# Patient Record
Sex: FEMALE | NOT HISPANIC OR LATINO | Employment: UNEMPLOYED | URBAN - METROPOLITAN AREA
[De-identification: names, ages, dates, MRNs, and addresses within clinical notes are randomized per-mention and may not be internally consistent; named-entity substitution may affect disease eponyms.]

---

## 2022-06-29 LAB — HM MAMMOGRAM LEFT: NORMAL

## 2023-12-13 ENCOUNTER — OFFICE VISIT (OUTPATIENT)
Dept: OTOLARYNGOLOGY | Facility: CLINIC | Age: 68
End: 2023-12-13
Payer: COMMERCIAL

## 2023-12-13 ENCOUNTER — CLINICAL SUPPORT (OUTPATIENT)
Dept: AUDIOLOGY | Facility: CLINIC | Age: 68
End: 2023-12-13
Payer: COMMERCIAL

## 2023-12-13 VITALS — DIASTOLIC BLOOD PRESSURE: 81 MMHG | TEMPERATURE: 97.9 F | WEIGHT: 141 LBS | SYSTOLIC BLOOD PRESSURE: 131 MMHG

## 2023-12-13 DIAGNOSIS — R68.2 DRY MOUTH: ICD-10-CM

## 2023-12-13 DIAGNOSIS — H90.3 SENSORINEURAL HEARING LOSS (SNHL) OF BOTH EARS: Primary | ICD-10-CM

## 2023-12-13 DIAGNOSIS — H61.21 IMPACTED CERUMEN OF RIGHT EAR: ICD-10-CM

## 2023-12-13 PROCEDURE — 1126F AMNT PAIN NOTED NONE PRSNT: CPT

## 2023-12-13 PROCEDURE — 1159F MED LIST DOCD IN RCRD: CPT

## 2023-12-13 PROCEDURE — 1160F RVW MEDS BY RX/DR IN RCRD: CPT

## 2023-12-13 PROCEDURE — 92550 TYMPANOMETRY & REFLEX THRESH: CPT | Performed by: AUDIOLOGIST

## 2023-12-13 PROCEDURE — 69210 REMOVE IMPACTED EAR WAX UNI: CPT

## 2023-12-13 PROCEDURE — 99203 OFFICE O/P NEW LOW 30 MIN: CPT

## 2023-12-13 PROCEDURE — 92557 COMPREHENSIVE HEARING TEST: CPT | Performed by: AUDIOLOGIST

## 2023-12-13 RX ORDER — CELECOXIB 100 MG/1
CAPSULE ORAL EVERY 24 HOURS
COMMUNITY
Start: 2023-06-15

## 2023-12-13 RX ORDER — CALCIUM CARBONATE 600 MG
TABLET ORAL EVERY 24 HOURS
COMMUNITY

## 2023-12-13 RX ORDER — CYCLOBENZAPRINE HCL 10 MG
TABLET ORAL EVERY 24 HOURS
COMMUNITY

## 2023-12-13 RX ORDER — ATORVASTATIN CALCIUM 80 MG/1
TABLET, FILM COATED ORAL EVERY 24 HOURS
COMMUNITY

## 2023-12-13 RX ORDER — ROMOSOZUMAB-AQQG 105 MG/1.17ML
INJECTION, SOLUTION SUBCUTANEOUS
COMMUNITY

## 2023-12-13 ASSESSMENT — PAIN - FUNCTIONAL ASSESSMENT: PAIN_FUNCTIONAL_ASSESSMENT: 0-10

## 2023-12-13 ASSESSMENT — PAIN SCALES - GENERAL: PAINLEVEL_OUTOF10: 0 - NO PAIN

## 2023-12-13 ASSESSMENT — ENCOUNTER SYMPTOMS: OCCASIONAL FEELINGS OF UNSTEADINESS: 0

## 2023-12-13 NOTE — PROGRESS NOTES
DIAGNOSES/PROBLEMS:  -Sensorineural hearing loss, bilateral  -Cerumen impaction, right ear  -Dry oral mucosa  PROVIDER IMPRESSIONS:  ASSESSMENT: Carlos Hawkins  is a pleasant 68 y.o. female who presents with symptoms of hearing difficulty in both ears and throat discomfort in cold temperatures. Based on the clinical information provided, symptoms and clinical exam findings are consistent with partial cerumen impaction in the right ear and dry oral mucosa. Using appropriate instrumentation, cerumen successfully removed from the right EAC. Reassurance provided that otologic exam appears normal today without evidence of acute infection or inflammation bilaterally. Audiogram reviewed in detail with the patient and her son, revealing mild sensorineural hearing loss across all frequencies in bilateral ears. Given the patiens concern for hearing difficulty, we discussed that she should consider hearing aid evaluation for amplification needs. Patient declined consideration for hearing aids at this time and we agreed to perform routine annual surveillance of her hearing function instead.      PLAN:  I recommended increased oral fluid intake, air humidifier for the bedroom, nasal saline spray as needed, and Biotene mouth rinse as needed to improve moisture for the nasopharynx. Sample provided of [place] nasal saline spray.   Follow-up: Patient may schedule for follow up in one year with repeat audiogram as routine surveillance of hearing function. Patient was instructed to follow up sooner if needed or for any new/worsening otologic symptoms. Patient is amendable to plan. All questions answered to patient and her son's satisfaction.   Subjective   Patient ID Carlos Hawkins is a 68 y.o. female who presents for initial evaluation of hearing loss in both ears and throat discomfort in cold temperature changes. Patient is accompanied to the visit today by her son for Upper sorbian translation.   History of Present Illness  Carlos Hawkins is a  68 y.o. female here for hearing loss in both ears that has progressively worsened over the past six years. Reported difficulty hearing softer speech sounds but is still able to communicate. She believes that she is able to hear slightly better in the right ear when compared to the left ear. When asked about ear pain, ear itching,  ear drainage, aural fullness, hearing loss, autophony, tinnitus, dizziness or vertigo, she admits to none.   When asked about previous ear surgeries, family history of hearing loss, or exposure to loud noise, the patient admits to a history of presbycusis with her mother and sister. When asked about past or current use of hearing aids, the patient admits to none. When asked about past or current use of Q-tips or foreign objects in the ear canal, the patient admits to none. Mentions that she has throat dryness/discomfort with abrupt cold temperatures in the weather. Son states that she does not drink much water throughout the day.     No past medical history on file.   No past surgical history on file.   No Known Allergies     Current Outpatient Medications:     atorvastatin (Lipitor) 80 mg tablet, once every 24 hours., Disp: , Rfl:     calcium carbonate 600 mg calcium (1,500 mg) tablet, once every 24 hours., Disp: , Rfl:     celecoxib (CeleBREX) 100 mg capsule, once every 24 hours., Disp: , Rfl:     cyclobenzaprine (Flexeril) 10 mg tablet, once every 24 hours., Disp: , Rfl:     romosozumab (Evenity) 210mg/2.34mL ( 105mg/1.17mLx2) prefilled syringe, 2.34 mL Subcutaneous, Disp: , Rfl:    Tobacco Use: Not on file      Alcohol Use: Not on file      Social History     Substance and Sexual Activity   Drug Use Not on file        Review of Systems   All other systems negative.     Objective   Visit Vitals  /81 (BP Location: Left arm, Patient Position: Sitting, BP Cuff Size: Adult)   Temp 36.6 °C (97.9 °F)   Wt 64 kg (141 lb)      Physical Exam  General appearance: Appears well,  well-nourished, well groomed. No acute distress.   Constitutional: No fever, chills, weight loss or weight gain.  Communication: Normal communication in Azeri.   Psychiatric: Oriented to person, place and time. Normal mood and affect.  Neurologic: Cranial nerves II-XII grossly intact and symmetric bilaterally.  Cardiovascular: Examination of peripheral vascular system shows no clubbing or cyanosis.  Respiratory: No respiratory distress increased work of breathing. Inspection of the chest with symmetric chest expansion and normal respiratory effort.  Skin: No head and neck rashes.  Head: Normocephalic. Atraumatic with no masses, lesions or scarring.  Face: Normal symmetry. No scars or deformities.  Eyes: Conjunctiva not edematous or erythematous. PERRLA  Neck: Supple and symmetric, trachea midline. Lymph nodes with no adenopathy.  Head: Normocephalic. Atraumatic with no masses, lesions or scarring.  Eyes: PERRL, EOMI, Conjunctiva is clear. No nystagmus.  Nose: External inspection of nose: No nasal lesions, lacerations or scars. No tenderness on frontal or maxillary sinus palpation.  Throat:  Floor of mouth is clear, no masses.  Tongue appears normal, no lesions or masses. Gums, gingiva, buccal mucosa appear pink and dry, no lesions. Teeth are intact.  No obvious dental infections.  Peritonsillar regions appear symmetric without swelling.  Hard and soft palate appear normal, no obvious cleft. Uvula is midline.  Left Tonsil -- 2+ no exudates.  Right Tonsil -- 2+ no exudates.  Oropharynx: No lesions. Retropharyngeal wall is flat.  []postnasal drip.  Salivary Glands: Symmetric bilaterally.  No palpable masses.  No evidence of acute infection or salivary stones.  Neck: Supple, no lymphadenopathy.  No masses.  TMJ: Normal, no trismus.  Right Ear: External inspection of ear with no deformity, scars, or masses. Mastoid is nontender. External auditory canal is partially impacted with cerumen. Unable to visualize TM.   Left  Ear: External inspection of ear with no deformity, scars, or masses. Mastoid is nontender. External auditory canal clear.  Tympanic membrane is intact with no sign of infection, effusion, or retraction.  No perforation seen. Auto insufflation visible under microscopy.    Results   I personally reviewed the patient's audiogram today from 12/13/2023 which showed: Mild sensorineural hearing loss across all frequencies in bilateral ears. Normal tympanogram bilaterally. At 75 dB, there is 88% word discrimination bilaterally. Acoustic reflexes absent right ipsilateral, and present left ipsilateral only at 500 and 1000 Hz and absent above.     Procedures   EAR CLEANING PROCEDURE NOTE:  Indication: Cerumen impaction  Location: right ear canals  Procedure Note: The procedure was performed by the provider.  Visualization Instrument: A microscope with a #5 speculum was placed in the ear canals to visualize the ear canal debris.  Ear Cleaning Instrument and Outcome: Using the suction/alligator, a moderate amount of firm, brown cerumen was removed from the impacted EAC(s).  Post-Procedure/Microscopic Otologic Exam:  Right Ear--TM is intact with no sign of infection, effusion, or retraction.  No perforation seen. EAC is clear. Auto insufflation visible under microscopy.  Patient Status: The patient tolerated the procedure well.  Complications: There were no complications.     Mila Block, APRN-CNP

## 2023-12-13 NOTE — PROGRESS NOTES
AUDIOLOGY ADULT AUDIOMETRIC EVALUATION      Name:  Carlos Hawkins  :  1955  Age:  68 y.o.  Date of Evaluation: 23    History:  Reason for visit:  Ms. Carlos Hawkins was seen today as part of the visit with KAYLIE Luciano for an evaluation of hearing.   The patient was accompanied by her son who assisted in providing the case history, as the patient speaks limited English.  Chief Complaint   Patient presents with    Hearing Loss     Stated for the past six years she has noticed a gradual loss of hearing sensitivity. Reported some difficulty hearing softer sounds of speech, however, is still able to communicate. Her son reported he believes there is a history of presbycusis in the family.  Stated she has a history of dry cerumen buildup and that when she experiences a cold it may muffle her hearing.   Denied any current otalgia, aural fullness, tinnitus, dizziness/vertigo, recent ear/sinus infections, recent falls, or sudden hearing loss.    EVALUATION      See Audiogram    RESULTS:    Otoscopic Evaluation:   Right Ear: Otoscopy revealed deep dry partially occluding cerumen and the tympanic membrane was unable able to be clearly visualized.   Left Ear: Otoscopy for the left ear revealed a clear healthy canal and a healthy tympanic membrane was visualized.     Immittance:  Immittance Measures: 226 Hz   Right Ear: Tympanometric testing revealed a normal type A tympanogram with normal middle ear pressure and normal static compliance.  Left Ear: Tympanometric testing revealed a normal type A tympanogram with normal middle ear pressure and normal static compliance.    Right Ear: Ipsilateral acoustic reflexes were absent at, 500-4,000 Hz.   Left Ear: Ipsilateral acoustic reflexes were present at, 500-1,000 Hz, at expected sensation levels and absent at 2,000- 4,000 Hz.    Test technique:  Pure Tone Audiometry via insert earphones    Reliability:   good    Pure Tone Audiometry:    Right Ear:  Audiometric testing indicated a mild essentially flat sensorineural hearing loss across the frequency range.  Left Ear:  Audiometric testing indicated a mild essentially flat sensorineural hearing loss across the frequency range.      Speech Audiometry:   Right Ear:  Speech Reception Threshold (SRT) was obtained at 35 dBHL                  Word Recognition scores were good (88%) in quiet when words were presented at 75 dBHL  Left Ear:  Speech Reception Threshold (SRT) was obtained at 35 dBHL                 Word Recognition scores were good (88%) in quiet when words were presented at 75 dBHL  Testing was performed with recorded NU-6 speech words in quiet. Speech thresholds were in good agreement with the pure tone averages in each ear.     IMPRESSIONS:  Today's test results indicate a mild sensorineural hearing loss, bilaterally.  The patient was counseled with regard to the findings.    Amplification needs:  Hearing aids should be considered due to the hearing loss and the patient's concerns for hearing difficulties.     RECOMMENDATIONS:  * Continue medical follow up with KAYLIE Luciano.  * Retest as medically indicated, or sooner if a change in hearing sensitivity is noticed.   * Wear hearing protection while in the presence of loud sounds.   * Pending medical management and patient desire, consider returning for a hearing aid evaluation to discuss amplification options and communication needs. The patient was instructed to call their insurance to check for any hearing aid benefits and to determine if [facility] was in network for hearing aids.   * Use effective communication strategies such as asking the speaker to gain attention prior to speaking, speaking in the same room, repeating words that were heard, etc.      PATIENT EDUCATION:   Discussed results and recommendations with the patient and a copy of the hearing test was provided.  Questions were addressed and the patient was  encouraged to contact our department should concerns arise.  The patient was seen from 1:00-1:30 pm.

## 2024-03-20 PROBLEM — M81.0 AGE-RELATED OSTEOPOROSIS WITHOUT CURRENT PATHOLOGICAL FRACTURE: Status: ACTIVE | Noted: 2024-02-08

## 2024-03-20 PROBLEM — D69.3 CHRONIC ITP (IDIOPATHIC THROMBOCYTOPENIA)  (CMD): Status: ACTIVE | Noted: 2024-03-20

## 2024-03-20 PROBLEM — E78.2 MIXED HYPERLIPIDEMIA: Status: ACTIVE | Noted: 2024-03-20

## 2024-07-10 ENCOUNTER — CLINICAL ABSTRACT (OUTPATIENT)
Dept: CARE COORDINATION | Age: 69
End: 2024-07-10